# Patient Record
Sex: FEMALE | Race: WHITE | NOT HISPANIC OR LATINO | URBAN - METROPOLITAN AREA
[De-identification: names, ages, dates, MRNs, and addresses within clinical notes are randomized per-mention and may not be internally consistent; named-entity substitution may affect disease eponyms.]

---

## 2023-03-08 ENCOUNTER — TELEPHONE (OUTPATIENT)
Dept: PRIMARY CARE | Facility: CLINIC | Age: 68
End: 2023-03-08

## 2023-03-08 DIAGNOSIS — U07.1 COVID-19: Primary | ICD-10-CM

## 2023-03-08 RX ORDER — NIRMATRELVIR AND RITONAVIR 300-100 MG
300 KIT ORAL 2 TIMES DAILY
Qty: 30 TABLET | Refills: 0 | Status: SHIPPED | OUTPATIENT
Start: 2023-03-08

## 2023-03-08 NOTE — TELEPHONE ENCOUNTER
She has cough, chills, body aches, sore throat , congestion--symptoms started on Monday.    Tested for Covid yesterday & is Positive    She would like Paxlovid , if you feel she would benefit from it    To Michael Hubbard

## 2023-03-09 ENCOUNTER — TELEPHONE (OUTPATIENT)
Dept: PRIMARY CARE | Facility: CLINIC | Age: 68
End: 2023-03-09

## 2023-03-09 NOTE — TELEPHONE ENCOUNTER
Patient is positive for COVID. What should she do? Left on voicemail, please call patient to find out how long? Symptom severity, etc.